# Patient Record
Sex: MALE | Race: OTHER | Employment: UNEMPLOYED | URBAN - NONMETROPOLITAN AREA
[De-identification: names, ages, dates, MRNs, and addresses within clinical notes are randomized per-mention and may not be internally consistent; named-entity substitution may affect disease eponyms.]

---

## 2020-10-13 ENCOUNTER — HOSPITAL ENCOUNTER (OUTPATIENT)
Dept: GENERAL RADIOLOGY | Age: 25
Discharge: HOME | End: 2020-10-15

## 2020-10-13 ENCOUNTER — OFFICE VISIT (OUTPATIENT)
Dept: PRIMARY CARE CLINIC | Age: 25
End: 2020-10-13

## 2020-10-13 VITALS
DIASTOLIC BLOOD PRESSURE: 74 MMHG | TEMPERATURE: 97.1 F | SYSTOLIC BLOOD PRESSURE: 110 MMHG | BODY MASS INDEX: 27.09 KG/M2 | WEIGHT: 172.6 LBS | HEART RATE: 71 BPM | HEIGHT: 67 IN | OXYGEN SATURATION: 98 %

## 2020-10-13 PROCEDURE — 99203 OFFICE O/P NEW LOW 30 MIN: CPT | Performed by: FAMILY MEDICINE

## 2020-10-13 PROCEDURE — 73590 X-RAY EXAM OF LOWER LEG: CPT

## 2020-10-13 SDOH — HEALTH STABILITY: MENTAL HEALTH: HOW OFTEN DO YOU HAVE A DRINK CONTAINING ALCOHOL?: NEVER

## 2020-10-13 ASSESSMENT — PATIENT HEALTH QUESTIONNAIRE - PHQ9
1. LITTLE INTEREST OR PLEASURE IN DOING THINGS: 0
SUM OF ALL RESPONSES TO PHQ9 QUESTIONS 1 & 2: 0
SUM OF ALL RESPONSES TO PHQ QUESTIONS 1-9: 0
2. FEELING DOWN, DEPRESSED OR HOPELESS: 0
SUM OF ALL RESPONSES TO PHQ QUESTIONS 1-9: 0

## 2020-10-13 NOTE — PROGRESS NOTES
[address]  [address] [place]  [phone]      Taz Sheffield is a 23 y.o. male who is c/o of Leg Pain (left leg - playing soccer and got his left leg kicked - hurts when he walks on it and bruised)      HPI:     Leg Pain    The incident occurred 12 to 24 hours ago (yesterday). The incident occurred at home. The injury mechanism was a direct blow (was playing soccer yesterday and was kicked directly in the L shin). The pain is present in the left leg. The pain is at a severity of 10/10. The pain has been constant since onset. Treatments tried: BioFreeze-type cream.     Interview/exam done with interpretor over phone. Subjective:      Past Medical History:   Diagnosis Date    Leg fracture, left 2008    broke left leg at age 8      History reviewed. No pertinent surgical history. Social History     Tobacco Use    Smoking status: Never Smoker    Smokeless tobacco: Never Used   Substance Use Topics    Alcohol use: Never     Frequency: Never      Current Outpatient Medications   Medication Sig Dispense Refill    mupirocin (BACTROBAN) 2 % ointment Apply to wound twice daily as needed. 1 Tube 1     No current facility-administered medications for this visit. No Known Allergies    Review of Systems    Objective:     Vitals:    10/13/20 1527   BP: 110/74   Pulse: 71   Temp: 97.1 °F (36.2 °C)   TempSrc: Tympanic   SpO2: 98%   Weight: 172 lb 9.6 oz (78.3 kg)   Height: 5' 7\" (1.702 m)     Physical Exam  Constitutional:       General: He is not in acute distress. Appearance: Normal appearance. Skin:     General: Skin is warm and dry. Findings: Bruising present. Neurological:      General: No focal deficit present. Mental Status: He is alert. Psychiatric:         Mood and Affect: Mood normal.         Assessment:       Diagnosis Orders   1. Contusion of left lower extremity, initial encounter     2.  Abrasion of left leg, initial encounter     3. Injury of left leg, initial encounter  XR TIBIA FIBULA LEFT (2 VIEWS)    mupirocin (BACTROBAN) 2 % ointment       Plan:      Return if symptoms worsen or fail to improve in 5-7 days. Orders Placed This Encounter   Procedures    XR TIBIA FIBULA LEFT (2 VIEWS)     Standing Status:   Future     Number of Occurrences:   1     Standing Expiration Date:   10/13/2021     Order Specific Question:   Reason for exam:     Answer:   kicked during soccer game yesterday and now having pain in mid-to-lower tibia, worsening with ambulation     Orders Placed This Encounter   Medications    mupirocin (BACTROBAN) 2 % ointment     Sig: Apply to wound twice daily as needed. Dispense:  1 Tube     Refill:  1       Patient given educational materials - see patient instructions. Discussed use, benefit, and side effects of prescribed medications. All patient questions answered. Pt voiced understanding.        Electronically signed by Tory Maria DO on 10/13/2020 at 4:50 PM

## 2020-10-13 NOTE — PATIENT INSTRUCTIONS
Patient Education        Contusión: Instrucciones de cuidado  Contusion: Care Instructions  Instrucciones de cuidado    Contusión es el término médico para un moretón. Es el resultado de un golpe directo o un impacto, marquita ramírez caída. Las contusiones son lesiones deportivas comunes. Brand Glidden de las personas se imaginan un moretón marquita ramírez philip [place]. Ocurre cuando pequeños vasos sanguíneos se rompen y hugo escapar mellisa bajo la piel. Tejinder los [employer], los músculos y los órganos también pueden [place] puede dañar los tejidos profundos, tejinder no causar un moretón visible. [address] [employer] hará un examen físico para encontrar la ubicación de la contusión. También pueden hacerle pruebas para asegurarse de que no tiene [address] grave, marquita ramírez fractura de Ron o daño nervioso. Estas pueden incluir radiografías u otras pruebas por imágenes, marquita ramírez tomografía computarizada o ramírez resonancia magnética. Las contusiones del tejido profundo pueden provocar dolor e inflamación. Sin embargo, si no hay daño grave, a menudo mejoran en pocas semanas con tratamiento en el hogar. El médico lo pena examinado minuciosamente, tejinder pueden presentarse problemas más tarde. Si nota algún problema o nuevos síntomas, busque tratamiento médico de inmediato. La atención de seguimiento es ramírez parte clave de shi tratamiento y seguridad. Asegúrese de hacer y acudir a todas las citas, y llame a shi médico si está teniendo problemas. También es ramírez buena idea saber los resultados de gurvinder exámenes y mantener ramírez lista de los medicamentos que trung. ¿Cómo puede cuidarse en el hogar? · Colóquese hielo o ramírez compresa fría en la mavis adolorida por entre 10 y 21 minutos cada vez para detener la hinchazón. Póngase un paño vu entre el hielo y la piel. · Sea luis enrique con los medicamentos. Agata y siga todas las instrucciones de la [place].   ? Si el médico le recetó un analgésico (medicamento para el dolor), tómelo según las indicaciones. ? Si no está tomando un analgésico recetado, pregúntele a shi médico si puede cj jake de [employer]. · De ser posible, eleve la mavis adolorida sobre almohadas tanto marquita pueda marichuy los siguientes días. Trate de mantener la mavis adolorida por encima del nivel del corazón. ¿Cuándo debe pedir ayuda? Llame a shi médico ahora mismo o busque atención médica inmediata si:    · Shi dolor empeora.     · Tiene hinchazón nueva o peor en la mavis.     · Siente hormigueo, debilidad o entumecimiento en la mavis cercana a la contusión.     · La mavis cercana a la contusión está fría o pálida. Preste especial atención a los cambios en shi shea y asegúrese de comunicarse con shi médico si:    · No mejora marquita se esperaba. ¿Dónde puede encontrar más información en inglés? Vallorie June a [email or web address] e ingrese a shi cuenta de MyChart. Mark Pl[address] en el Jojo Zully \"Search Health Information\" para más información (en inglés) sobre \"Contusión: Instrucciones de cuidado. \"     Si no tiene ramírez cuenta, danielle martha en el enlace \"Sign Up Now\". Revisado: 26 junio, 2019               Versión del contenido: 12.6  © 8245-1847 Healthwise, Incorporated. Las instrucciones de cuidado fueron adaptadas bajo licencia por [facility] ([facility]). Si usted tiene Athena Rudyard afección médica o sobre estas instrucciones, siempre pregunte a shi profesional de shea. Healthwise, Incorporated niega toda garantía o responsabilidad por shi uso de esta información. Patient Education        Raspones (excoriaciones): Instrucciones de cuidado  Scrapes (Abrasions): Care Instructions  Instrucciones de cuidado  Los raspones (excoriaciones) son heridas en donde la piel ha sido frotada o arrancada. La mayoría de los raspones no penetran mucho en la piel, chad algunos pueden llegar a desprender varias capas de piel. Los raspones no suelen sangrar mucho, chad pueden supurar un líquido rosáceo.  Los raspones en la yudy o en la james pueden parecer peor de lo que son. Pueden sangrar mucho debido a la buena irrigación sanguínea de estas zonas. La mayoría de los raspones sanan marco y es posible que no requieran un vendaje. Suelen sanar dentro de 3 a 7 días. Un raspón chris y profundo puede tardar de 1 a 2 semanas o más en sanar. En algunos raspones se puede formar Neri Tanya. La atención de seguimiento es ramírez parte clave de shi tratamiento y seguridad. Asegúrese de hacer y acudir a todas las citas, y llame a shi médico si está teniendo problemas. También es ramírez buena idea saber los resultados de gurvinder exámenes y mantener ramírez lista de los medicamentos que trung. ¿Cómo puede cuidarse en el hogar? · Si shi médico le dijo cómo cuidarse la herida, siga las instrucciones de shi médico. Si no le nel instrucciones, siga estos consejos generales:  ? Lávese el raspón con agua limpia 2 veces al día. No use peróxido de hidrógeno (agua [place]) ni alcohol, los cuales pueden retrasar la sanación. ? Puede cubrirse el raspón con ramírez capa delgada de vaselina y Mallie Meckel venda no adherente. ? Aplíquese más vaselina y [place] la venda según sea necesario. · Mantenga elevada la mavis lesionada sobre ramírez almohada en cualquier momento que se siente o se acueste marichuy los 3 días siguientes. Trate de mantener la mavis por encima del nivel del corazón. [place] ayudará a reducir la hinchazón. · Sea luis enrique con los medicamentos. [place] los analgésicos (medicamentos para el dolor) exactamente según las indicaciones. ? Si el médico le recetó un analgésico, tómelo según las indicaciones. ? Si no está tomando un analgésico recetado, pregúntele a shi médico si puede cj jake de [employer]. ¿Cuándo debe pedir ayuda? Llame a shi médico ahora mismo o busque atención médica inmediata si:    · Tiene señales de infección, tales marquita:  ? Aumento del dolor, la hinchazón, el enrojecimiento o la temperatura alrededor del raspón. ? Vetas rojizas que salen del raspón.   ? Pus que sale del